# Patient Record
Sex: FEMALE | Race: WHITE | NOT HISPANIC OR LATINO | Employment: FULL TIME | URBAN - METROPOLITAN AREA
[De-identification: names, ages, dates, MRNs, and addresses within clinical notes are randomized per-mention and may not be internally consistent; named-entity substitution may affect disease eponyms.]

---

## 2017-02-01 ENCOUNTER — TRANSCRIBE ORDERS (OUTPATIENT)
Dept: ADMINISTRATIVE | Facility: HOSPITAL | Age: 47
End: 2017-02-01

## 2017-02-01 DIAGNOSIS — R93.89 ABNORMAL CXR: Primary | ICD-10-CM

## 2017-02-06 ENCOUNTER — HOSPITAL ENCOUNTER (OUTPATIENT)
Dept: GENERAL RADIOLOGY | Facility: HOSPITAL | Age: 47
Discharge: HOME | End: 2017-02-06
Attending: FAMILY MEDICINE | Admitting: FAMILY MEDICINE

## 2017-02-06 ENCOUNTER — HOSPITAL ENCOUNTER (OUTPATIENT)
Dept: GENERAL RADIOLOGY | Facility: HOSPITAL | Age: 47
Discharge: HOME | End: 2017-02-06
Attending: FAMILY MEDICINE

## 2017-02-06 DIAGNOSIS — R93.89 ABNORMAL CHEST X-RAY: ICD-10-CM

## 2017-02-06 DIAGNOSIS — R93.89 ABNORMAL CXR: ICD-10-CM

## 2017-02-06 PROCEDURE — 76000 FLUOROSCOPY <1 HR PHYS/QHP: CPT

## 2017-02-06 PROCEDURE — 71035: CPT

## 2017-02-10 ENCOUNTER — APPOINTMENT (OUTPATIENT)
Dept: GENERAL RADIOLOGY | Facility: HOSPITAL | Age: 47
End: 2017-02-10
Attending: FAMILY MEDICINE

## 2017-11-03 ENCOUNTER — TRANSCRIBE ORDERS (OUTPATIENT)
Dept: ADMINISTRATIVE | Facility: HOSPITAL | Age: 47
End: 2017-11-03

## 2017-11-03 DIAGNOSIS — Z12.31 VISIT FOR SCREENING MAMMOGRAM: Primary | ICD-10-CM

## 2018-01-04 ENCOUNTER — HOSPITAL ENCOUNTER (OUTPATIENT)
Dept: MAMMOGRAPHY | Facility: HOSPITAL | Age: 48
Discharge: HOME | End: 2018-01-04
Attending: FAMILY MEDICINE | Admitting: FAMILY MEDICINE

## 2018-01-04 DIAGNOSIS — Z12.31 VISIT FOR SCREENING MAMMOGRAM: ICD-10-CM

## 2018-01-04 PROCEDURE — 77067 SCR MAMMO BI INCL CAD: CPT

## 2018-01-04 PROCEDURE — 77063 BREAST TOMOSYNTHESIS BI: CPT | Performed by: RADIOLOGY

## 2018-01-04 PROCEDURE — 77067 SCR MAMMO BI INCL CAD: CPT | Performed by: RADIOLOGY

## 2018-01-04 PROCEDURE — 77063 BREAST TOMOSYNTHESIS BI: CPT

## 2019-03-21 ENCOUNTER — HOSPITAL ENCOUNTER (OUTPATIENT)
Dept: GENERAL RADIOLOGY | Facility: HOSPITAL | Age: 49
Discharge: HOME | End: 2019-03-21
Admitting: FAMILY MEDICINE

## 2019-03-21 ENCOUNTER — TRANSCRIBE ORDERS (OUTPATIENT)
Dept: ADMINISTRATIVE | Facility: HOSPITAL | Age: 49
End: 2019-03-21

## 2019-03-21 DIAGNOSIS — M50.30 DEGENERATIVE DISC DISEASE, CERVICAL: Primary | ICD-10-CM

## 2019-03-21 PROCEDURE — 72050 X-RAY EXAM NECK SPINE 4/5VWS: CPT

## 2022-10-14 ENCOUNTER — OFFICE VISIT (OUTPATIENT)
Dept: NEUROLOGY | Facility: CLINIC | Age: 52
End: 2022-10-14

## 2022-10-14 ENCOUNTER — LAB (OUTPATIENT)
Dept: LAB | Facility: HOSPITAL | Age: 52
End: 2022-10-14

## 2022-10-14 VITALS
HEIGHT: 64 IN | OXYGEN SATURATION: 99 % | TEMPERATURE: 97.1 F | HEART RATE: 63 BPM | SYSTOLIC BLOOD PRESSURE: 118 MMHG | BODY MASS INDEX: 36.7 KG/M2 | DIASTOLIC BLOOD PRESSURE: 72 MMHG | WEIGHT: 215 LBS

## 2022-10-14 DIAGNOSIS — G35 MULTIPLE SCLEROSIS: ICD-10-CM

## 2022-10-14 DIAGNOSIS — G35 MULTIPLE SCLEROSIS: Primary | ICD-10-CM

## 2022-10-14 LAB
ALBUMIN SERPL-MCNC: 4.9 G/DL (ref 3.5–5.2)
ALBUMIN/GLOB SERPL: 2.2 G/DL
ALP SERPL-CCNC: 86 U/L (ref 39–117)
ALT SERPL W P-5'-P-CCNC: 25 U/L (ref 1–33)
ANION GAP SERPL CALCULATED.3IONS-SCNC: 12.5 MMOL/L (ref 5–15)
AST SERPL-CCNC: 30 U/L (ref 1–32)
BILIRUB SERPL-MCNC: 0.3 MG/DL (ref 0–1.2)
BUN SERPL-MCNC: 19 MG/DL (ref 6–20)
BUN/CREAT SERPL: 23.5 (ref 7–25)
CALCIUM SPEC-SCNC: 9.8 MG/DL (ref 8.6–10.5)
CHLORIDE SERPL-SCNC: 102 MMOL/L (ref 98–107)
CO2 SERPL-SCNC: 25.5 MMOL/L (ref 22–29)
CREAT SERPL-MCNC: 0.81 MG/DL (ref 0.57–1)
EGFRCR SERPLBLD CKD-EPI 2021: 87.5 ML/MIN/1.73
GLOBULIN UR ELPH-MCNC: 2.2 GM/DL
GLUCOSE SERPL-MCNC: 90 MG/DL (ref 65–99)
POTASSIUM SERPL-SCNC: 4.7 MMOL/L (ref 3.5–5.2)
PROT SERPL-MCNC: 7.1 G/DL (ref 6–8.5)
SODIUM SERPL-SCNC: 140 MMOL/L (ref 136–145)

## 2022-10-14 PROCEDURE — 85025 COMPLETE CBC W/AUTO DIFF WBC: CPT

## 2022-10-14 PROCEDURE — 80053 COMPREHEN METABOLIC PANEL: CPT

## 2022-10-14 PROCEDURE — 36415 COLL VENOUS BLD VENIPUNCTURE: CPT

## 2022-10-14 PROCEDURE — 99204 OFFICE O/P NEW MOD 45 MIN: CPT | Performed by: PSYCHIATRY & NEUROLOGY

## 2022-10-14 RX ORDER — SERTRALINE HYDROCHLORIDE 25 MG/1
25 TABLET, FILM COATED ORAL
COMMUNITY
Start: 2022-08-08

## 2022-10-14 RX ORDER — MELATONIN
1000 DAILY
COMMUNITY
End: 2022-12-22

## 2022-10-14 RX ORDER — CHOLECALCIFEROL (VITAMIN D3) 125 MCG
5 CAPSULE ORAL NIGHTLY PRN
COMMUNITY

## 2022-10-14 NOTE — PROGRESS NOTES
Subjective      Patient ID: Jeaneth Jimenez is a 51 y.o. female     Chief Complaint   Patient presents with   • Multiple Sclerosis        History of Present Illness    51 y.o. female referred by Dr Omari Gray for SAMUEL.    Fatigue moderate to severe.    Occasional tingling in extremities.      Sx are stable on Zeposia.      Reviewed medical records:     R LE numbness for 4 weeks.     Summer 2020 right hand numbness for 6 weeks.      OD ON 2020 tx IVMP    MRI Brain/cervical 2020 multiple T2 lesions, two enhancing lesions, C4 lesion    DMT:  Zeposia,     Past Medical History:   Diagnosis Date   • Bladder problem    • Cluster headache    • Light headed    • MS (multiple sclerosis) (HCC)    • Numbness and tingling      Family History   Problem Relation Age of Onset   • Breast cancer Mother 56   • Alzheimer's disease Mother    • Breast cancer - additional onset Mother    • Osteoporosis Mother    • Heart attack Father    • Alcohol abuse Father    • Hypertension Father    • Arthritis Maternal Grandmother    • Heart attack Paternal Grandmother    • Ovarian cancer Neg Hx      Social History     Socioeconomic History   • Marital status: Single   Tobacco Use   • Smoking status: Former     Types: Cigarettes     Quit date: 2013     Years since quittin.7   • Smokeless tobacco: Never   Vaping Use   • Vaping Use: Never used   Substance and Sexual Activity   • Alcohol use: Yes     Alcohol/week: 2.0 standard drinks     Types: 2 Glasses of wine per week     Comment: approx 2-3 glasses per week   • Drug use: No   • Sexual activity: Defer     Comment: signed pregnancy waiver       Review of Systems   Constitutional: Positive for fatigue. Negative for activity change and unexpected weight change.   HENT: Negative for tinnitus and trouble swallowing.    Eyes: Positive for visual disturbance. Negative for photophobia.   Respiratory: Negative for apnea, cough and choking.    Cardiovascular: Negative for leg  "swelling.   Gastrointestinal: Negative for nausea and vomiting.   Endocrine: Negative for cold intolerance and heat intolerance.   Genitourinary: Negative for difficulty urinating, frequency, menstrual problem and urgency.   Musculoskeletal: Negative for back pain, gait problem, myalgias and neck pain.   Skin: Negative for color change and rash.   Allergic/Immunologic: Negative for immunocompromised state.   Neurological: Positive for numbness. Negative for dizziness, tremors, seizures, syncope, facial asymmetry, speech difficulty, weakness, light-headedness and headaches.   Hematological: Negative for adenopathy. Does not bruise/bleed easily.   Psychiatric/Behavioral: Negative for behavioral problems, confusion, decreased concentration, hallucinations and sleep disturbance.       Objective     Vitals:    10/14/22 1135   BP: 118/72   Pulse: 63   Temp: 97.1 °F (36.2 °C)   SpO2: 99%   Weight: 97.5 kg (215 lb)   Height: 162.6 cm (64\")       Neurologic Exam     Mental Status   Oriented to person, place, and time.   Speech: speech is normal   Level of consciousness: alert  Knowledge: good and consistent with education.   Normal comprehension.     Cranial Nerves   Cranial nerves II through XII intact.     CN II   Visual fields full to confrontation.   Visual acuity: normal  Right visual field deficit: none  Left visual field deficit: none     CN III, IV, VI   Pupils are equal, round, and reactive to light.  Extraocular motions are normal.   Nystagmus: none   Diplopia: none  Ophthalmoparesis: none  Upgaze: normal  Downgaze: normal  Conjugate gaze: present    CN V   Facial sensation intact.   Right corneal reflex: normal  Left corneal reflex: normal    CN VII   Right facial weakness: none  Left facial weakness: none    CN VIII   Hearing: intact    CN IX, X   Palate: symmetric  Right gag reflex: normal  Left gag reflex: normal    CN XI   Right sternocleidomastoid strength: normal  Left sternocleidomastoid strength: " normal    CN XII   Tongue: not atrophic  Fasciculations: absent  Tongue deviation: none  Mild red color desaturation      Motor Exam   Muscle bulk: normal  Overall muscle tone: normal    Strength   Strength 5/5 throughout.     Sensory Exam   Light touch normal.     Gait, Coordination, and Reflexes     Gait  Gait: normal    Tremor   Resting tremor: absent  Intention tremor: absent  Action tremor: absent    Reflexes   Reflexes 2+ except as noted.       Physical Exam  Eyes:      Extraocular Movements: EOM normal.      Pupils: Pupils are equal, round, and reactive to light.   Neurological:      Mental Status: She is oriented to person, place, and time.      Cranial Nerves: Cranial nerves 2-12 are intact.      Motor: Motor strength is normal.      Gait: Gait is intact.   Psychiatric:         Speech: Speech normal.         Hospital Outpatient Visit on 12/27/2016   Component Date Value Ref Range Status   • BH CV STRESS PROTOCOL 1 12/27/2016 [place]   Final   • Stage 1 12/27/2016 1   Final   • HR Stage 1 12/27/2016 123   Final   • BP Stage 1 12/27/2016 144/90   Final   • Duration Min Stage 1 12/27/2016 3   Final   • Duration Sec Stage 1 12/27/2016 0   Final   • Grade Stage 1 12/27/2016 10   Final   • Speed Stage 1 12/27/2016 1.7   Final   • BH CV STRESS METS STAGE 1 12/27/2016 5   Final   • Stage 2 12/27/2016 2   Final   • HR Stage 2 12/27/2016 144   Final   • BP Stage 2 12/27/2016 156/90   Final   • Duration Min Stage 2 12/27/2016 3   Final   • Duration Sec Stage 2 12/27/2016 0   Final   • Grade Stage 2 12/27/2016 12   Final   • Speed Stage 2 12/27/2016 2.5   Final   • BH CV STRESS METS STAGE 2 12/27/2016 7.5   Final   • Stage 3 12/27/2016 3   Final   • HR Stage 3 12/27/2016 157   Final   • BP Stage 3 12/27/2016 190/64   Final   • Duration Min Stage 3 12/27/2016 1   Final   • Duration Sec Stage 3 12/27/2016 45   Final   • Grade Stage 3 12/27/2016 14   Final   • Speed Stage 3 12/27/2016 3.4   Final   • BH CV STRESS METS STAGE  3 12/27/2016 10.0   Final   • Baseline HR 12/27/2016 72  bpm Final   • Baseline BP 12/27/2016 130/88  mmHg Final   • Target HR (85%) 12/27/2016 147  bpm Final   • Max. Pred. HR (100%) 12/27/2016 173  bpm Final   • Exercise duration (min) 12/27/2016 7  min Final   • Exercise duration (sec) 12/27/2016 45  sec Final   • Peak HR 12/27/2016 157  bpm Final   • Percent Max Pred HR 12/27/2016 90.75  % Final   • Percent Target HR 12/27/2016 107  % Final   • Peak BP 12/27/2016 190/64  mmHg Final   • Recovery HR 12/27/2016 96  bpm Final   • Recovery BP 12/27/2016 148/84  mmHg Final   • Estimated workload 12/27/2016 8.7  METS Final   • Nuc Stress EF 12/27/2016 84  % Final         Assessment & Plan     Problem List Items Addressed This Visit        Neuro    Multiple sclerosis (HCC) - Primary (Chronic)    Current Assessment & Plan     MRI B/C    Continue Zeposia    CBC, CMP           Relevant Orders    MRI Brain With & Without Contrast    MRI Cervical Spine With & Without Contrast    CBC & Differential    Comprehensive Metabolic Panel          No follow-ups on file.

## 2022-10-15 LAB
BASOPHILS # BLD AUTO: 0.04 10*3/MM3 (ref 0–0.2)
BASOPHILS NFR BLD AUTO: 0.7 % (ref 0–1.5)
DEPRECATED RDW RBC AUTO: 40.7 FL (ref 37–54)
EOSINOPHIL # BLD AUTO: 0.23 10*3/MM3 (ref 0–0.4)
EOSINOPHIL NFR BLD AUTO: 4 % (ref 0.3–6.2)
ERYTHROCYTE [DISTWIDTH] IN BLOOD BY AUTOMATED COUNT: 12.2 % (ref 12.3–15.4)
HCT VFR BLD AUTO: 41.4 % (ref 34–46.6)
HGB BLD-MCNC: 14.1 G/DL (ref 12–15.9)
IMM GRANULOCYTES # BLD AUTO: 0.04 10*3/MM3 (ref 0–0.05)
IMM GRANULOCYTES NFR BLD AUTO: 0.7 % (ref 0–0.5)
LYMPHOCYTES # BLD AUTO: 0.63 10*3/MM3 (ref 0.7–3.1)
LYMPHOCYTES NFR BLD AUTO: 11 % (ref 19.6–45.3)
MCH RBC QN AUTO: 31 PG (ref 26.6–33)
MCHC RBC AUTO-ENTMCNC: 34.1 G/DL (ref 31.5–35.7)
MCV RBC AUTO: 91 FL (ref 79–97)
MONOCYTES # BLD AUTO: 0.76 10*3/MM3 (ref 0.1–0.9)
MONOCYTES NFR BLD AUTO: 13.3 % (ref 5–12)
NEUTROPHILS NFR BLD AUTO: 4.02 10*3/MM3 (ref 1.7–7)
NEUTROPHILS NFR BLD AUTO: 70.3 % (ref 42.7–76)
NRBC BLD AUTO-RTO: 0 /100 WBC (ref 0–0.2)
PLATELET # BLD AUTO: 276 10*3/MM3 (ref 140–450)
PMV BLD AUTO: 10.6 FL (ref 6–12)
RBC # BLD AUTO: 4.55 10*6/MM3 (ref 3.77–5.28)
WBC NRBC COR # BLD: 5.72 10*3/MM3 (ref 3.4–10.8)

## 2022-11-23 ENCOUNTER — HOSPITAL ENCOUNTER (OUTPATIENT)
Dept: MRI IMAGING | Facility: HOSPITAL | Age: 52
Discharge: HOME | End: 2022-11-23

## 2022-11-23 DIAGNOSIS — G35 MULTIPLE SCLEROSIS: ICD-10-CM

## 2022-11-23 PROCEDURE — 72156 MRI NECK SPINE W/O & W/DYE: CPT

## 2022-11-23 PROCEDURE — 70553 MRI BRAIN STEM W/O & W/DYE: CPT

## 2022-11-23 PROCEDURE — 0 GADOBENATE DIMEGLUMINE 529 MG/ML SOLUTION: Performed by: PSYCHIATRY & NEUROLOGY

## 2022-11-23 PROCEDURE — A9577 INJ MULTIHANCE: HCPCS | Performed by: PSYCHIATRY & NEUROLOGY

## 2022-11-23 RX ADMIN — GADOBENATE DIMEGLUMINE 20 ML: 529 INJECTION, SOLUTION INTRAVENOUS at 16:18

## 2022-12-12 ENCOUNTER — TELEPHONE (OUTPATIENT)
Dept: NEUROLOGY | Facility: CLINIC | Age: 52
End: 2022-12-12

## 2022-12-12 NOTE — TELEPHONE ENCOUNTER
----- Message from Jayce Park MD sent at 11/25/2022  8:31 AM EST -----  Notify pt her MRI is stable       Jayce Park MD Dieruf, Stephanie S, MA  Notify pt MRI of neck does not have any MS lesions, mild arthritis

## 2022-12-12 NOTE — TELEPHONE ENCOUNTER
Notified of results and Jeaneth states understanding. As well confirmed appt for next thurs at 11:15am. Thanks!

## 2022-12-22 ENCOUNTER — OFFICE VISIT (OUTPATIENT)
Dept: NEUROLOGY | Facility: CLINIC | Age: 52
End: 2022-12-22

## 2022-12-22 VITALS
OXYGEN SATURATION: 98 % | BODY MASS INDEX: 38.94 KG/M2 | SYSTOLIC BLOOD PRESSURE: 118 MMHG | HEART RATE: 67 BPM | WEIGHT: 227 LBS | DIASTOLIC BLOOD PRESSURE: 78 MMHG

## 2022-12-22 DIAGNOSIS — G35 MULTIPLE SCLEROSIS: Primary | Chronic | ICD-10-CM

## 2022-12-22 PROCEDURE — 99214 OFFICE O/P EST MOD 30 MIN: CPT | Performed by: PSYCHIATRY & NEUROLOGY

## 2022-12-22 NOTE — PROGRESS NOTES
"Chief Complaint    [facility] Follow up/ [facility]    Subjective        Jeaneth Jimenez presents to [facility]     History of Present Illness    52 y.o. female returns in follow up.  Last visit on 10/14/2 ordered MRI B/C, labs.     MRI B/c, my review of films, moderate T2 lesion load, moderate C5/6 stenosis.     Fatigue moderate to severe.     Occasional tingling in extremities.       Sx are stable on Zeposia.       Reviewed medical records:     2016/17 R LE numbness for 4 weeks.      Summer 2020 right hand numbness for 6 weeks.       OD ON 11/16/2020 tx IVMP     MRI Brain/cervical 11/26/2020 multiple T2 lesions, two enhancing lesions, C4 lesion     DMT:  Zeposia    Objective   Vital Signs:  /78   Pulse 67   Wt 103 kg (227 lb)   SpO2 98%   BMI 38.94 kg/m²   Estimated body mass index is 38.94 kg/m² as calculated from the following:    Height as of 11/23/22: 162.6 cm (64.02\").    Weight as of this encounter: 103 kg (227 lb).          Physical Exam  Eyes:      Extraocular Movements: EOM normal.      Pupils: Pupils are equal, round, and reactive to light.   Neurological:      Mental Status: She is oriented to person, place, and time.      Cranial Nerves: Cranial nerves 2-12 are intact.      Motor: Motor strength is normal.      Gait: Gait is intact.   Psychiatric:         Speech: Speech normal.          Neurologic Exam     Mental Status   Oriented to person, place, and time.   Speech: speech is normal   Level of consciousness: alert  Knowledge: good and consistent with education.   Normal comprehension.     Cranial Nerves   Cranial nerves II through XII intact.     CN II   Visual fields full to confrontation.   Visual acuity: normal  Right visual field deficit: none  Left visual field deficit: none     CN III, IV, VI   Pupils are equal, round, and reactive to light.  Extraocular motions are normal.   Nystagmus: none   Diplopia: none  Ophthalmoparesis: none  Upgaze: " normal  Downgaze: normal  Conjugate gaze: present    CN V   Facial sensation intact.   Right corneal reflex: normal  Left corneal reflex: normal    CN VII   Right facial weakness: none  Left facial weakness: none    CN VIII   Hearing: intact    CN IX, X   Palate: symmetric  Right gag reflex: normal  Left gag reflex: normal    CN XI   Right sternocleidomastoid strength: normal  Left sternocleidomastoid strength: normal    CN XII   Tongue: not atrophic  Fasciculations: absent  Tongue deviation: none    Motor Exam   Muscle bulk: normal  Overall muscle tone: normal    Strength   Strength 5/5 throughout.     Sensory Exam   Light touch normal.     Gait, Coordination, and Reflexes     Gait  Gait: normal    Tremor   Resting tremor: absent  Intention tremor: absent  Action tremor: absent    Reflexes   Reflexes 2+ except as noted.      Result Review :  The following data was reviewed by: Jayce Park MD on 12/22/2022:  Common labs    Common Labs 10/14/22 10/14/22    1201 1201   Glucose 90    BUN 19    Creatinine 0.81    Sodium 140    Potassium 4.7    Chloride 102    Calcium 9.8    Albumin 4.90    Total Bilirubin 0.3    Alkaline Phosphatase 86    AST (SGOT) 30    ALT (SGPT) 25    WBC  5.72   Hemoglobin  14.1   Hematocrit  41.4   Platelets  276                     Assessment and Plan   Diagnoses and all orders for this visit:    1. Multiple sclerosis (HCC) (Primary)  Assessment & Plan:  MSFC stable     Stable on Zeposia                  Follow Up   No follow-ups on file.  Patient was given instructions and counseling regarding her condition or for health maintenance advice. Please see specific information pulled into the AVS if appropriate.